# Patient Record
Sex: FEMALE | URBAN - METROPOLITAN AREA
[De-identification: names, ages, dates, MRNs, and addresses within clinical notes are randomized per-mention and may not be internally consistent; named-entity substitution may affect disease eponyms.]

---

## 2020-09-30 ENCOUNTER — APPOINTMENT (OUTPATIENT)
Dept: URBAN - METROPOLITAN AREA SURGERY 9 | Age: 40
Setting detail: DERMATOLOGY
End: 2020-09-30

## 2020-09-30 PROBLEM — D23.71 OTHER BENIGN NEOPLASM OF SKIN OF RIGHT LOWER LIMB, INCLUDING HIP: Status: ACTIVE | Noted: 2020-09-30

## 2020-09-30 PROBLEM — D48.5 NEOPLASM OF UNCERTAIN BEHAVIOR OF SKIN: Status: ACTIVE | Noted: 2020-09-30

## 2020-09-30 PROCEDURE — OTHER COUNSELING: OTHER

## 2020-09-30 PROCEDURE — OTHER MIPS QUALITY: OTHER

## 2020-09-30 PROCEDURE — 99201: CPT | Mod: 25

## 2020-09-30 PROCEDURE — 11102 TANGNTL BX SKIN SINGLE LES: CPT

## 2020-09-30 PROCEDURE — OTHER BIOPSY BY SHAVE METHOD: OTHER

## 2022-04-12 ENCOUNTER — APPOINTMENT (OUTPATIENT)
Dept: URBAN - METROPOLITAN AREA SURGERY 9 | Age: 42
Setting detail: DERMATOLOGY
End: 2022-04-12

## 2022-04-12 DIAGNOSIS — L81.1 CHLOASMA: ICD-10-CM

## 2022-04-12 DIAGNOSIS — L28.0 LICHEN SIMPLEX CHRONICUS: ICD-10-CM

## 2022-04-12 DIAGNOSIS — L82.1 OTHER SEBORRHEIC KERATOSIS: ICD-10-CM

## 2022-04-12 DIAGNOSIS — Z41.9 ENCOUNTER FOR PROCEDURE FOR PURPOSES OTHER THAN REMEDYING HEALTH STATE, UNSPECIFIED: ICD-10-CM

## 2022-04-12 DIAGNOSIS — L28.1 PRURIGO NODULARIS: ICD-10-CM

## 2022-04-12 DIAGNOSIS — L81.4 OTHER MELANIN HYPERPIGMENTATION: ICD-10-CM

## 2022-04-12 PROBLEM — L30.9 DERMATITIS, UNSPECIFIED: Status: ACTIVE | Noted: 2022-04-12

## 2022-04-12 PROCEDURE — OTHER SUNSCREEN RECOMMENDATIONS: OTHER

## 2022-04-12 PROCEDURE — OTHER REASSURANCE: OTHER

## 2022-04-12 PROCEDURE — 99214 OFFICE O/P EST MOD 30 MIN: CPT

## 2022-04-12 PROCEDURE — OTHER COUNSELING: OTHER

## 2022-04-12 PROCEDURE — OTHER PRESCRIPTION: OTHER

## 2022-04-12 PROCEDURE — OTHER PRESCRIPTION MEDICATION MANAGEMENT: OTHER

## 2022-04-12 PROCEDURE — OTHER OTHER: OTHER

## 2022-04-12 RX ORDER — BETAMETHASONE DIPROPIONATE 0.5 MG/G
CREAM, AUGMENTED TOPICAL
Qty: 15 | Refills: 2 | Status: ERX | COMMUNITY
Start: 2022-04-12

## 2022-04-12 ASSESSMENT — LOCATION DETAILED DESCRIPTION DERM
LOCATION DETAILED: RIGHT CHIN
LOCATION DETAILED: LEFT CHIN
LOCATION DETAILED: LEFT LATERAL EYEBROW
LOCATION DETAILED: RIGHT CENTRAL MALAR CHEEK
LOCATION DETAILED: RIGHT DISTAL CALF
LOCATION DETAILED: RIGHT LATERAL MALAR CHEEK
LOCATION DETAILED: LEFT CENTRAL MALAR CHEEK
LOCATION DETAILED: PERIUMBILICAL SKIN

## 2022-04-12 ASSESSMENT — LOCATION ZONE DERM
LOCATION ZONE: TRUNK
LOCATION ZONE: FACE
LOCATION ZONE: LEG

## 2022-04-12 ASSESSMENT — LOCATION SIMPLE DESCRIPTION DERM
LOCATION SIMPLE: CHIN
LOCATION SIMPLE: LEFT EYEBROW
LOCATION SIMPLE: RIGHT CALF
LOCATION SIMPLE: LEFT CHEEK
LOCATION SIMPLE: RIGHT CHEEK
LOCATION SIMPLE: ABDOMEN

## 2022-04-12 NOTE — PROCEDURE: PRESCRIPTION MEDICATION MANAGEMENT
Render In Strict Bullet Format?: No
Detail Level: Simple
Initiate Treatment: Betamethasome cream bid for 2 weeks, take a week off and repeat prn.

## 2022-04-12 NOTE — HPI: SKIN LESION
What Type Of Note Output Would You Prefer (Optional)?: Standard Output
How Severe Is Your Skin Lesion?: mild
Has Your Skin Lesion Been Treated?: not been treated
Is This A New Presentation, Or A Follow-Up?: Skin Lesion
Additional History: Pt thinks these spots are worse in the winter.

## 2022-04-12 NOTE — PROCEDURE: OTHER
Detail Level: Simple
Other (Free Text): Recommended consult with DL or AC to discuss laser hair removal options. Aware of $75 consult fee.
Render Risk Assessment In Note?: no
Note Text (......Xxx Chief Complaint.): This diagnosis correlates with the
Other (Free Text): Declines treatment today, will discuss at cosmetic consult

## 2022-06-29 ENCOUNTER — APPOINTMENT (OUTPATIENT)
Dept: URBAN - METROPOLITAN AREA SURGERY 9 | Age: 42
Setting detail: DERMATOLOGY
End: 2022-06-29

## 2022-06-29 DIAGNOSIS — L82.1 OTHER SEBORRHEIC KERATOSIS: ICD-10-CM

## 2022-06-29 DIAGNOSIS — L81.5 LEUKODERMA, NOT ELSEWHERE CLASSIFIED: ICD-10-CM

## 2022-06-29 DIAGNOSIS — I78.1 NEVUS, NON-NEOPLASTIC: ICD-10-CM

## 2022-06-29 DIAGNOSIS — Z41.9 ENCOUNTER FOR PROCEDURE FOR PURPOSES OTHER THAN REMEDYING HEALTH STATE, UNSPECIFIED: ICD-10-CM

## 2022-06-29 PROBLEM — D23.71 OTHER BENIGN NEOPLASM OF SKIN OF RIGHT LOWER LIMB, INCLUDING HIP: Status: ACTIVE | Noted: 2022-06-29

## 2022-06-29 PROBLEM — D23.5 OTHER BENIGN NEOPLASM OF SKIN OF TRUNK: Status: ACTIVE | Noted: 2022-06-29

## 2022-06-29 PROCEDURE — OTHER COSMETIC CONSULTATION: LHR: OTHER

## 2022-06-29 PROCEDURE — OTHER COUNSELING: OTHER

## 2022-06-29 PROCEDURE — OTHER COSMETIC CONSULTATION - PULSED-DYE LASER: OTHER

## 2022-06-29 PROCEDURE — OTHER OTHER: OTHER

## 2022-06-29 PROCEDURE — OTHER COSMETIC CONSULTATION: PULSED-DYE LASER: OTHER

## 2022-06-29 PROCEDURE — 99212 OFFICE O/P EST SF 10 MIN: CPT

## 2022-06-29 ASSESSMENT — LOCATION DETAILED DESCRIPTION DERM
LOCATION DETAILED: LEFT LATERAL EYEBROW
LOCATION DETAILED: LEFT PROXIMAL PRETIBIAL REGION
LOCATION DETAILED: LEFT CHIN
LOCATION DETAILED: LEFT LOWER CUTANEOUS LIP
LOCATION DETAILED: LEFT LATERAL EYEBROW
LOCATION DETAILED: NASAL TIP
LOCATION DETAILED: RIGHT PROXIMAL PRETIBIAL REGION

## 2022-06-29 ASSESSMENT — LOCATION SIMPLE DESCRIPTION DERM
LOCATION SIMPLE: LEFT PRETIBIAL REGION
LOCATION SIMPLE: LEFT LIP
LOCATION SIMPLE: CHIN
LOCATION SIMPLE: NOSE
LOCATION SIMPLE: LEFT EYEBROW
LOCATION SIMPLE: RIGHT PRETIBIAL REGION
LOCATION SIMPLE: LEFT EYEBROW

## 2022-06-29 ASSESSMENT — LOCATION ZONE DERM
LOCATION ZONE: LIP
LOCATION ZONE: NOSE
LOCATION ZONE: LEG
LOCATION ZONE: FACE
LOCATION ZONE: FACE

## 2022-06-29 NOTE — PROCEDURE: OTHER
Other (Free Text): discussed LN2 to remove if cosmetically bothersome to patient; cost would be $100
Note Text (......Xxx Chief Complaint.): This diagnosis correlates with the
Render Risk Assessment In Note?: no
Detail Level: Detailed

## 2022-07-15 ENCOUNTER — APPOINTMENT (OUTPATIENT)
Dept: URBAN - METROPOLITAN AREA SURGERY 9 | Age: 42
Setting detail: DERMATOLOGY
End: 2022-07-15

## 2022-07-15 DIAGNOSIS — L82.1 OTHER SEBORRHEIC KERATOSIS: ICD-10-CM

## 2022-07-15 PROCEDURE — OTHER OTHER: OTHER

## 2022-07-15 PROCEDURE — OTHER BENIGN DESTRUCTION COSMETIC: OTHER

## 2022-07-15 ASSESSMENT — LOCATION SIMPLE DESCRIPTION DERM: LOCATION SIMPLE: LEFT EYEBROW

## 2022-07-15 ASSESSMENT — LOCATION ZONE DERM: LOCATION ZONE: FACE

## 2022-07-15 ASSESSMENT — LOCATION DETAILED DESCRIPTION DERM: LOCATION DETAILED: LEFT LATERAL EYEBROW

## 2022-07-15 NOTE — PROCEDURE: OTHER
Render Risk Assessment In Note?: no
Detail Level: Detailed
Note Text (......Xxx Chief Complaint.): This diagnosis correlates with the
Other (Free Text): light ED at setting of 1.5. $100 cosmetic fee

## 2022-07-15 NOTE — PROCEDURE: BENIGN DESTRUCTION COSMETIC
Post-Care Instructions: I reviewed with the patient in detail post-care instructions. Patient is to wear sunprotection, and avoid picking at any of the treated lesions. Pt may apply Vaseline to crusted or scabbing areas.
Detail Level: Detailed
Anesthesia Volume In Cc: 0.4
Anesthesia Type: 1% lidocaine with 1:100,000 epinephrine and a 1:10 solution of 8.4% sodium bicarbonate
Consent: The patient's consent was obtained including but not limited to risks of crusting, scabbing, blistering, scarring, darker or lighter pigmentary change, recurrence, incomplete removal and infection.

## 2022-11-28 ENCOUNTER — APPOINTMENT (OUTPATIENT)
Dept: URBAN - METROPOLITAN AREA SURGERY 9 | Age: 42
Setting detail: DERMATOLOGY
End: 2022-11-28

## 2022-11-28 DIAGNOSIS — I78.1 NEVUS, NON-NEOPLASTIC: ICD-10-CM

## 2022-11-28 DIAGNOSIS — L81.4 OTHER MELANIN HYPERPIGMENTATION: ICD-10-CM

## 2022-11-28 DIAGNOSIS — Z41.9 ENCOUNTER FOR PROCEDURE FOR PURPOSES OTHER THAN REMEDYING HEALTH STATE, UNSPECIFIED: ICD-10-CM

## 2022-11-28 PROCEDURE — OTHER KTP LASER: OTHER

## 2022-11-28 PROCEDURE — OTHER EXCEL V LASER: OTHER

## 2022-11-28 PROCEDURE — OTHER LASER HAIR REMOVAL: OTHER

## 2022-11-28 ASSESSMENT — LOCATION DETAILED DESCRIPTION DERM
LOCATION DETAILED: RIGHT SUPERIOR LATERAL MALAR CHEEK
LOCATION DETAILED: LEFT CHIN
LOCATION DETAILED: LEFT LATERAL EYEBROW
LOCATION DETAILED: RIGHT LATERAL MALAR CHEEK

## 2022-11-28 ASSESSMENT — LOCATION SIMPLE DESCRIPTION DERM
LOCATION SIMPLE: CHIN
LOCATION SIMPLE: RIGHT CHEEK
LOCATION SIMPLE: LEFT EYEBROW

## 2022-11-28 ASSESSMENT — LOCATION ZONE DERM: LOCATION ZONE: FACE

## 2022-11-28 NOTE — PROCEDURE: LASER HAIR REMOVAL
Anesthesia Type: 1% lidocaine with epinephrine
Treatment Number: 0
Post-Procedure Care: Immediate endpoint: perifollicular erythema and edema. Vaseline and sunscreen (to sun-exposed areas) applied. Post care reviewed with patient.
Cooling: DCD setting
Detail Level: Simple
Spot Size: 1.5 mm
Post-Care Instructions: I reviewed with the patient in detail post-care instructions. Patient should avoid sun for a minimum of 4 weeks before and after treatment.
Tolerated Procedure (Optional): Tolerated Well
Laser Type: diode 810nm
Fluence (Will Not Render If 0): 24
Fluence (Will Not Render If 0): 20
Render Post-Care In The Note: No
Pulse Duration (Include Units): 30
Treatment Number: 1
Pre-Procedure: Prior to proceeding the treatment areas were cleaned and all present put on their eye protection. When using the LightSheer diode laser, cooling aloe gel applied to all treatment areas prior to treatment and throughout treatment.
Spot Size: 12 mm
Eye Shield Text: Given the treatment area eye shields were inserted prior to treatment.
Consent: Written consent obtained, risks reviewed including but not limited to crusting, scabbing, blistering, scarring, darker or lighter pigmentary change, paradoxical hair regrowth, incomplete removal of hair and infection.
Cooling: chill tip
Shaving (Optional): The patient shaved at home

## 2022-11-28 NOTE — PROCEDURE: KTP LASER
Laser Type: KTP laser 532nm
Detail Level: Simple
Repetition Rate (Hz): 1
Spot Size: 3 mm
Immediate Endpoint: lesions changing color from brown to gray
Post-Care Instructions: I reviewed with the patient in detail post-care instructions. Patient should stay away from the sun and wear sun protection until treated areas are fully healed.
Pulse Duration (Msec): 9
Consent: Written consent obtained, risks reviewed including but not limited to crusting, scabbing, blistering, scarring, darker or lighter pigmentary change, incidental hair removal, bruising, and/or incomplete removal.
Treated Area: small area
Fluence (J/Cm2): 11
Post-Procedure Instructions: Vaseline and sunscreen applied. Post care reviewed with patient. Handout given

## 2022-11-28 NOTE — PROCEDURE: EXCEL V LASER
Laser Type: KTP 532nm
Detail Level: Zone
Post-Care Instructions: I reviewed with the patient in detail post-care instructions. Patient is to apply vaseline with a q-tip to all crusted areas, and avoid picking at any scabs. Pt should stay away from the sun and wear sun protection until fully healed. Handout given.
Consent: Written consent obtained, risks reviewed including but not limited to crusting, scabbing, blistering, scarring, darker or lighter pigmentary change, and/or incomplete removal.
Procedural Text: The treatment areas where then treated as noted above.
Spot Size: 3
Temperature: 10 C
Repetition Rate: 0.2 Hz
Fluence In J: 8
Pulse Width In Msec: 4
Pre-Procedure Text: After consent was obtained and the procedure was explained, all persons present in the exam room put on their protective eyewear.
Post-Procedure Text: Following the treatment, Aquaphor and broad spectrum sunscreen was applied to the treatment areas.  Post-care instructions were discussed. Handout given
External Cooling Fan Speed: 5
Treatment Number (Will Not Render If 0): 1

## 2023-02-02 ENCOUNTER — RX ONLY (RX ONLY)
Age: 43
End: 2023-02-02

## 2023-02-02 ENCOUNTER — APPOINTMENT (OUTPATIENT)
Dept: URBAN - METROPOLITAN AREA SURGERY 9 | Age: 43
Setting detail: DERMATOLOGY
End: 2023-02-02

## 2023-02-02 DIAGNOSIS — Z41.9 ENCOUNTER FOR PROCEDURE FOR PURPOSES OTHER THAN REMEDYING HEALTH STATE, UNSPECIFIED: ICD-10-CM

## 2023-02-02 PROCEDURE — OTHER LASER HAIR REMOVAL: OTHER

## 2023-02-02 NOTE — PROCEDURE: LASER HAIR REMOVAL
Anesthesia Type: 1% lidocaine with epinephrine
Treatment Number: 0
Post-Procedure Care: Immediate endpoint: perifollicular erythema and edema. Vaseline and sunscreen (to sun-exposed areas) applied. Post care reviewed with patient.
Cooling: DCD setting
Detail Level: Simple
Spot Size: 1.5 mm
Post-Care Instructions: I reviewed with the patient in detail post-care instructions. Patient should avoid sun for a minimum of 4 weeks before and after treatment.
Tolerated Procedure (Optional): Tolerated Well
Laser Type: Alexandrite 755nm
Fluence (Will Not Render If 0): 16
Fluence (Will Not Render If 0): 20
Render Post-Care In The Note: No
Pulse Duration (Include Units): 3ms
Total Pulses: 36
Treatment Number: 2
Pre-Procedure: Prior to proceeding the treatment areas were cleaned and all present put on their eye protection. When using the LightSheer diode laser, cooling aloe gel applied to all treatment areas prior to treatment and throughout treatment.
Spot Size: 18 mm
Eye Shield Text: Given the treatment area eye shields were inserted prior to treatment.
Consent: Written consent obtained, risks reviewed including but not limited to crusting, scabbing, blistering, scarring, darker or lighter pigmentary change, paradoxical hair regrowth, incomplete removal of hair and infection.
Cooling: DCD 30/20
Shaving (Optional): The patient shaved at home

## 2023-03-27 ENCOUNTER — APPOINTMENT (OUTPATIENT)
Dept: URBAN - METROPOLITAN AREA SURGERY 9 | Age: 43
Setting detail: DERMATOLOGY
End: 2023-03-27

## 2023-03-27 DIAGNOSIS — Z41.9 ENCOUNTER FOR PROCEDURE FOR PURPOSES OTHER THAN REMEDYING HEALTH STATE, UNSPECIFIED: ICD-10-CM

## 2023-03-27 DIAGNOSIS — L81.4 OTHER MELANIN HYPERPIGMENTATION: ICD-10-CM

## 2023-03-27 PROCEDURE — OTHER LASER HAIR REMOVAL: OTHER

## 2023-03-27 PROCEDURE — OTHER LASER BROWN SPOTS: OTHER

## 2023-03-27 ASSESSMENT — LOCATION DETAILED DESCRIPTION DERM
LOCATION DETAILED: RIGHT INFERIOR LATERAL FOREHEAD
LOCATION DETAILED: LEFT INFERIOR LATERAL FOREHEAD
LOCATION DETAILED: RIGHT CENTRAL MALAR CHEEK
LOCATION DETAILED: LEFT CENTRAL MALAR CHEEK

## 2023-03-27 ASSESSMENT — LOCATION SIMPLE DESCRIPTION DERM
LOCATION SIMPLE: RIGHT FOREHEAD
LOCATION SIMPLE: RIGHT CHEEK
LOCATION SIMPLE: LEFT FOREHEAD
LOCATION SIMPLE: LEFT CHEEK

## 2023-03-27 ASSESSMENT — LOCATION ZONE DERM: LOCATION ZONE: FACE

## 2023-03-27 NOTE — PROCEDURE: LASER BROWN SPOTS
Pulse Duration: 5 ms
Laser Type: Q-switched Nd:Yag 532nm
Spot Size In Mm: 3
Fluence: 2.2
Post Procedure Text: Vaseline and sunscreen (to sun-exposed areas) applied. Post care reviewed with patient. Handout given
Consent: Written consent obtained, risks reviewed including but not limited to crusting, scabbing, blistering, scarring, darker or lighter pigmentary change, and/or incomplete removal.
Post-Care Instructions: I reviewed with the patient in detail post-care instructions. Patient is to apply vaseline with a q-tip to all crusted areas, and avoid picking at any scabs. Pt should stay away from the sun and wear sun protection until fully healed.
Detail Level: Zone
External Cooling Fan Speed: 5

## 2023-03-27 NOTE — PROCEDURE: LASER HAIR REMOVAL
Render Post-Care In The Note: No
Post-Procedure Care: Immediate endpoint: perifollicular erythema and edema. Vaseline and sunscreen (to sun-exposed areas) applied. Post care reviewed with patient.
Fluence (Will Not Render If 0): 20
External Cooling Fan Speed: 0
Cooling: chill tip
Total Pulses: 35
Cooling: DCD setting
Shaving (Optional): The patient shaved at home
Anesthesia Type: 1% lidocaine with epinephrine
Detail Level: Simple
Spot Size: 12 mm
Spot Size: 1.5 mm
Laser Type: diode 810nm
Tolerated Procedure (Optional): Tolerated Well
Fluence (Will Not Render If 0): 24
Eye Shield Text: Given the treatment area eye shields were inserted prior to treatment.
Post-Care Instructions: I reviewed with the patient in detail post-care instructions. Patient should avoid sun for a minimum of 4 weeks before and after treatment.
Consent: Written consent obtained, risks reviewed including but not limited to crusting, scabbing, blistering, scarring, darker or lighter pigmentary change, paradoxical hair regrowth, incomplete removal of hair and infection.
Treatment Number: 3
Pre-Procedure: Prior to proceeding the treatment areas were cleaned and all present put on their eye protection. When using the LightSheer diode laser, cooling aloe gel applied to all treatment areas prior to treatment and throughout treatment.
Pulse Duration (Include Units): auto

## 2023-06-12 ENCOUNTER — APPOINTMENT (OUTPATIENT)
Dept: URBAN - METROPOLITAN AREA SURGERY 9 | Age: 43
Setting detail: DERMATOLOGY
End: 2023-06-12

## 2023-06-12 ENCOUNTER — RX ONLY (RX ONLY)
Age: 43
End: 2023-06-12

## 2023-06-12 DIAGNOSIS — Z41.9 ENCOUNTER FOR PROCEDURE FOR PURPOSES OTHER THAN REMEDYING HEALTH STATE, UNSPECIFIED: ICD-10-CM

## 2023-06-12 DIAGNOSIS — L81.4 OTHER MELANIN HYPERPIGMENTATION: ICD-10-CM

## 2023-06-12 PROCEDURE — OTHER LASER HAIR REMOVAL: OTHER

## 2023-06-12 PROCEDURE — OTHER LASER BROWN SPOTS: OTHER

## 2023-06-12 ASSESSMENT — LOCATION SIMPLE DESCRIPTION DERM
LOCATION SIMPLE: RIGHT CHEEK
LOCATION SIMPLE: LEFT FOREHEAD
LOCATION SIMPLE: LEFT CHEEK
LOCATION SIMPLE: RIGHT FOREHEAD

## 2023-06-12 ASSESSMENT — LOCATION DETAILED DESCRIPTION DERM
LOCATION DETAILED: LEFT INFERIOR LATERAL FOREHEAD
LOCATION DETAILED: LEFT CENTRAL MALAR CHEEK
LOCATION DETAILED: RIGHT CENTRAL MALAR CHEEK
LOCATION DETAILED: RIGHT INFERIOR LATERAL FOREHEAD

## 2023-06-12 ASSESSMENT — LOCATION ZONE DERM: LOCATION ZONE: FACE

## 2023-06-12 NOTE — PROCEDURE: LASER HAIR REMOVAL
Render Post-Care In The Note: No
Post-Procedure Care: Immediate endpoint: perifollicular erythema and edema. Vaseline and sunscreen (to sun-exposed areas) applied. Post care reviewed with patient.
Fluence (Will Not Render If 0): 20
External Cooling Fan Speed: 0
Cooling: DCD 30/20
Total Pulses: 28
Cooling: DCD setting
Shaving (Optional): The patient shaved at home
Anesthesia Type: 1% lidocaine with epinephrine
Detail Level: Simple
Spot Size: 18 mm
Spot Size: 1.5 mm
Laser Type: Alexandrite 755nm
Tolerated Procedure (Optional): Tolerated Well
Fluence (Will Not Render If 0): 16
Eye Shield Text: Given the treatment area eye shields were inserted prior to treatment.
Post-Care Instructions: I reviewed with the patient in detail post-care instructions. Patient should avoid sun for a minimum of 4 weeks before and after treatment.
Consent: Written consent obtained, risks reviewed including but not limited to crusting, scabbing, blistering, scarring, darker or lighter pigmentary change, paradoxical hair regrowth, incomplete removal of hair and infection.
Treatment Number: 4
Pre-Procedure: Prior to proceeding the treatment areas were cleaned and all present put on their eye protection. When using the LightSheer diode laser, cooling aloe gel applied to all treatment areas prior to treatment and throughout treatment.
Pulse Duration (Include Units): auto

## 2023-06-12 NOTE — PROCEDURE: LASER BROWN SPOTS
Laser Type: Q-switched Nd:Yag 532nm
Spot Size In Mm: 3
Fluence: 2.2
Post Procedure Text: Vaseline and sunscreen (to sun-exposed areas) applied. Post care reviewed with patient. Handout given
Consent: Written consent obtained, risks reviewed including but not limited to crusting, scabbing, blistering, scarring, darker or lighter pigmentary change, and/or incomplete removal.
Post-Care Instructions: I reviewed with the patient in detail post-care instructions. Patient is to apply vaseline with a q-tip to all crusted areas, and avoid picking at any scabs. Pt should stay away from the sun and wear sun protection until fully healed.
Detail Level: Zone
External Cooling Fan Speed: 5

## 2023-10-05 ENCOUNTER — APPOINTMENT (OUTPATIENT)
Dept: URBAN - METROPOLITAN AREA SURGERY 9 | Age: 43
Setting detail: DERMATOLOGY
End: 2023-10-05

## 2023-10-05 DIAGNOSIS — L81.1 CHLOASMA: ICD-10-CM

## 2023-10-05 DIAGNOSIS — Z41.9 ENCOUNTER FOR PROCEDURE FOR PURPOSES OTHER THAN REMEDYING HEALTH STATE, UNSPECIFIED: ICD-10-CM

## 2023-10-05 PROCEDURE — OTHER LASER BROWN SPOTS: OTHER

## 2023-10-05 PROCEDURE — OTHER PRESCRIPTION: OTHER

## 2023-10-05 PROCEDURE — OTHER LASER HAIR REMOVAL: OTHER

## 2023-10-05 PROCEDURE — OTHER PULSED-DYE LASER: OTHER

## 2023-10-05 ASSESSMENT — LOCATION ZONE DERM: LOCATION ZONE: FACE

## 2023-10-05 ASSESSMENT — LOCATION DETAILED DESCRIPTION DERM
LOCATION DETAILED: LEFT INFERIOR CENTRAL MALAR CHEEK
LOCATION DETAILED: RIGHT INFERIOR CENTRAL MALAR CHEEK

## 2023-10-05 ASSESSMENT — LOCATION SIMPLE DESCRIPTION DERM
LOCATION SIMPLE: LEFT CHEEK
LOCATION SIMPLE: RIGHT CHEEK

## 2023-10-05 NOTE — PROCEDURE: LASER BROWN SPOTS
Pulse Duration: 1.5 ms
Detail Level: Zone
Laser Type: Q-switched Nd:Yag 532nm
Post-Care Instructions: I reviewed with the patient in detail post-care instructions. Patient is to apply vaseline with a q-tip to all crusted areas, and avoid picking at any scabs. Pt should stay away from the sun and wear sun protection until fully healed.
Consent: Written consent obtained, risks reviewed including but not limited to crusting, scabbing, blistering, scarring, darker or lighter pigmentary change, and/or incomplete removal.
Post Procedure Text: Vaseline and sunscreen (to sun-exposed areas) applied. Post care reviewed with patient. Handout given
Fluence: 18
Spot Size In Mm: 3

## 2023-10-05 NOTE — PROCEDURE: LASER HAIR REMOVAL
Anesthesia Type: 1% lidocaine with epinephrine
Treatment Number: 0
Post-Procedure Care: Immediate endpoint: perifollicular erythema and edema. Vaseline and sunscreen (to sun-exposed areas) applied. Post care reviewed with patient.
Cooling: DCD setting
Detail Level: Simple
Spot Size: 1.5 mm
Post-Care Instructions: I reviewed with the patient in detail post-care instructions. Patient should avoid sun for a minimum of 4 weeks before and after treatment.
Tolerated Procedure (Optional): Tolerated Well
Laser Type: Nd:Yag 1064nm
Fluence (Will Not Render If 0): 16
Fluence (Will Not Render If 0): 20
Render Post-Care In The Note: No
Pulse Duration (Include Units): 3ms
Total Pulses: 36
Treatment Number: 5
Pre-Procedure: Prior to proceeding the treatment areas were cleaned and all present put on their eye protection. When using the LightSheer diode laser, cooling aloe gel applied to all treatment areas prior to treatment and throughout treatment.
Spot Size: 18 mm
Eye Shield Text: Given the treatment area eye shields were inserted prior to treatment.
Consent: Written consent obtained, risks reviewed including but not limited to crusting, scabbing, blistering, scarring, darker or lighter pigmentary change, paradoxical hair regrowth, incomplete removal of hair and infection.
Cooling: DCD 30/20
Shaving (Optional): The patient shaved at home

## 2023-10-05 NOTE — PROCEDURE: PULSED-DYE LASER
Delay Time (Ms): 20
Laser Type: Vbeam 595nm
Spot Size: 7 mm
Location Override: tip of nose spider angioma
Pulse Count (Optional): 2
Fluence In J/Cm2 (Optional): 9
Cryogen Time (Ms): 30
Pulse Duration: 10 ms
Location (Required For Details To Render In Note But Body Touch Will Also Count For First Location): mid face
Pulse Duration: 6 ms
Consent: Written consent obtained, risks reviewed including but not limited to crusting, scabbing, blistering, scarring, darker or lighter pigmentary change, incidental hair removal, bruising, and/or incomplete removal.
Post-Procedure Care: Vaseline and sunscreen applied. Post care reviewed with patient.
Post-Care Instructions: I reviewed with the patient in detail post-care instructions. Patient should stay away from the sun and wear sun protection until treated areas are fully healed.
Treated Area: small area
Detail Level: Simple
Location Override: cheeks
Location (Required For Details To Render In Note): nose
Pulse Duration: 1.5 ms
Immediate Endpoint: purpura

## 2024-02-01 ENCOUNTER — APPOINTMENT (OUTPATIENT)
Dept: URBAN - METROPOLITAN AREA SURGERY 9 | Age: 44
Setting detail: DERMATOLOGY
End: 2024-02-01

## 2024-02-01 DIAGNOSIS — Z41.9 ENCOUNTER FOR PROCEDURE FOR PURPOSES OTHER THAN REMEDYING HEALTH STATE, UNSPECIFIED: ICD-10-CM

## 2024-02-01 PROCEDURE — OTHER LASER HAIR REMOVAL: OTHER

## 2024-02-01 PROCEDURE — OTHER PULSED-DYE LASER: OTHER

## 2024-02-01 NOTE — PROCEDURE: LASER HAIR REMOVAL
Anesthesia Type: 1% lidocaine with epinephrine
Treatment Number: 0
Post-Procedure Care: Immediate endpoint: perifollicular erythema and edema. Vaseline and sunscreen (to sun-exposed areas) applied. Post care reviewed with patient.
Cooling: DCD setting
Detail Level: Simple
Spot Size: 1.5 mm
Post-Care Instructions: I reviewed with the patient in detail post-care instructions. Patient should avoid sun for a minimum of 4 weeks before and after treatment.
Tolerated Procedure (Optional): Tolerated Well
Laser Type: Alexandrite 755nm
Fluence (Will Not Render If 0): 16
Fluence (Will Not Render If 0): 20
Render Post-Care In The Note: No
Pulse Duration (Include Units): 3ms
Total Pulses: 53
Treatment Number: 6
Pre-Procedure: Prior to proceeding the treatment areas were cleaned and all present put on their eye protection. When using the LightSheer diode laser, cooling aloe gel applied to all treatment areas prior to treatment and throughout treatment.
Spot Size: 18 mm
Eye Shield Text: Given the treatment area eye shields were inserted prior to treatment.
Consent: Written consent obtained, risks reviewed including but not limited to crusting, scabbing, blistering, scarring, darker or lighter pigmentary change, paradoxical hair regrowth, incomplete removal of hair and infection.
Cooling: DCD 30/20
Shaving (Optional): The patient shaved at home

## 2024-02-01 NOTE — PROCEDURE: PULSED-DYE LASER
Delay Time (Ms): 20
Laser Type: Vbeam 595nm
Spot Size: 7 mm
Pulse Count (Location 2): 2
Fluence In J/Cm2 (Optional): 9
Cryogen Time (Ms): 30
Pulse Duration: 10 ms
Pulse Duration: 1.5 ms
Consent: Written consent obtained, risks reviewed including but not limited to crusting, scabbing, blistering, scarring, darker or lighter pigmentary change, incidental hair removal, bruising, and/or incomplete removal.
Post-Procedure Care: Vaseline and sunscreen applied. Post care reviewed with patient.
Post-Care Instructions: I reviewed with the patient in detail post-care instructions. Patient should stay away from the sun and wear sun protection until treated areas are fully healed.
Treated Area: small area
Detail Level: Simple
Fluence In J/Cm2 (Optional): 9.5
Location Override: spider angiomas x 3 on nasal tip
Pulse Count: 7
Immediate Endpoint: purpura

## 2025-03-20 ENCOUNTER — APPOINTMENT (OUTPATIENT)
Dept: URBAN - METROPOLITAN AREA SURGERY 9 | Age: 45
Setting detail: DERMATOLOGY
End: 2025-03-20

## 2025-03-20 DIAGNOSIS — D18.0 HEMANGIOMA: ICD-10-CM

## 2025-03-20 DIAGNOSIS — L82.1 OTHER SEBORRHEIC KERATOSIS: ICD-10-CM

## 2025-03-20 DIAGNOSIS — Z41.9 ENCOUNTER FOR PROCEDURE FOR PURPOSES OTHER THAN REMEDYING HEALTH STATE, UNSPECIFIED: ICD-10-CM

## 2025-03-20 DIAGNOSIS — L81.2 FRECKLES: ICD-10-CM

## 2025-03-20 DIAGNOSIS — L81.1 CHLOASMA: ICD-10-CM

## 2025-03-20 DIAGNOSIS — D22 MELANOCYTIC NEVI: ICD-10-CM

## 2025-03-20 DIAGNOSIS — L28.0 LICHEN SIMPLEX CHRONICUS: ICD-10-CM

## 2025-03-20 DIAGNOSIS — L81.4 OTHER MELANIN HYPERPIGMENTATION: ICD-10-CM

## 2025-03-20 PROBLEM — D23.71 OTHER BENIGN NEOPLASM OF SKIN OF RIGHT LOWER LIMB, INCLUDING HIP: Status: ACTIVE | Noted: 2025-03-20

## 2025-03-20 PROBLEM — D18.01 HEMANGIOMA OF SKIN AND SUBCUTANEOUS TISSUE: Status: ACTIVE | Noted: 2025-03-20

## 2025-03-20 PROBLEM — D22.5 MELANOCYTIC NEVI OF TRUNK: Status: ACTIVE | Noted: 2025-03-20

## 2025-03-20 PROCEDURE — OTHER REASSURANCE: OTHER

## 2025-03-20 PROCEDURE — OTHER MIPS QUALITY: OTHER

## 2025-03-20 PROCEDURE — OTHER COUNSELING: OTHER

## 2025-03-20 PROCEDURE — OTHER OTHER: OTHER

## 2025-03-20 PROCEDURE — OTHER SUNSCREEN RECOMMENDATIONS: OTHER

## 2025-03-20 PROCEDURE — OTHER PRESCRIPTION MEDICATION MANAGEMENT: OTHER

## 2025-03-20 PROCEDURE — OTHER PRESCRIPTION: OTHER

## 2025-03-20 PROCEDURE — 99214 OFFICE O/P EST MOD 30 MIN: CPT

## 2025-03-20 RX ORDER — BETAMETHASONE DIPROPIONATE 0.5 MG/G
CREAM TOPICAL
Qty: 50 | Refills: 0 | Status: ERX | COMMUNITY
Start: 2025-03-20

## 2025-03-20 ASSESSMENT — LOCATION SIMPLE DESCRIPTION DERM
LOCATION SIMPLE: CHEST
LOCATION SIMPLE: RIGHT CALF
LOCATION SIMPLE: LEFT SUPERIOR EYELID
LOCATION SIMPLE: RIGHT CHEEK
LOCATION SIMPLE: ABDOMEN
LOCATION SIMPLE: RIGHT SUPERIOR EYELID

## 2025-03-20 ASSESSMENT — LOCATION DETAILED DESCRIPTION DERM
LOCATION DETAILED: RIGHT PROXIMAL CALF
LOCATION DETAILED: RIGHT MEDIAL SUPERIOR CHEST
LOCATION DETAILED: PERIUMBILICAL SKIN
LOCATION DETAILED: LEFT LATERAL SUPERIOR EYELID
LOCATION DETAILED: RIGHT LATERAL SUPERIOR EYELID
LOCATION DETAILED: EPIGASTRIC SKIN
LOCATION DETAILED: RIGHT INFERIOR CENTRAL MALAR CHEEK

## 2025-03-20 ASSESSMENT — LOCATION ZONE DERM
LOCATION ZONE: TRUNK
LOCATION ZONE: LEG
LOCATION ZONE: EYELID
LOCATION ZONE: FACE

## 2025-03-20 NOTE — PROCEDURE: OTHER
Detail Level: Detailed
Note Text (......Xxx Chief Complaint.): This diagnosis correlates with the
Other (Free Text): Pt is concerned about aging around the eyes and skin laxity on upper eyelids. Discussed that non-surgical procedures can’t do much for her concerns, recommended seeing oculoplastics for an upper lid bleph.
Render Risk Assessment In Note?: no

## 2025-03-20 NOTE — PROCEDURE: PRESCRIPTION MEDICATION MANAGEMENT
Detail Level: Zone
Initiate Treatment: Betamethasone cream BID as directed
Render In Strict Bullet Format?: Yes
Continue Regimen: Melisma cream as directed

## 2025-03-20 NOTE — HPI: COSMETIC CONSULTATION
Additional History: Pt wants to talk about options for wrinkles around the eyes and possibly laser for brown spots.